# Patient Record
Sex: FEMALE | URBAN - METROPOLITAN AREA
[De-identification: names, ages, dates, MRNs, and addresses within clinical notes are randomized per-mention and may not be internally consistent; named-entity substitution may affect disease eponyms.]

---

## 2024-01-05 ENCOUNTER — HOSPITAL ENCOUNTER (OUTPATIENT)
Age: 34
Discharge: HOME | End: 2024-01-05
Attending: OBSTETRICS & GYNECOLOGY | Admitting: OBSTETRICS & GYNECOLOGY

## 2024-01-05 VITALS
OXYGEN SATURATION: 100 % | TEMPERATURE: 97.5 F | HEIGHT: 63 IN | SYSTOLIC BLOOD PRESSURE: 106 MMHG | HEART RATE: 85 BPM | WEIGHT: 138 LBS | DIASTOLIC BLOOD PRESSURE: 65 MMHG | BODY MASS INDEX: 24.45 KG/M2 | RESPIRATION RATE: 16 BRPM

## 2024-01-05 PROBLEM — R10.9 ABDOMINAL PAIN: Status: ACTIVE | Noted: 2024-01-05

## 2024-01-05 LAB
BACTERIA URNS QL MICRO: NORMAL /HPF
BILIRUB UR QL STRIP.AUTO: NEGATIVE
CLARITY UR: CLEAR
COLOR UR: YELLOW
EPI CELLS #/AREA URNS AUTO: 2 /HPF (ref 0–5)
GLUCOSE UR STRIP.AUTO-MCNC: NEGATIVE MG/DL
HGB UR QL STRIP.AUTO: ABNORMAL
HYALINE CASTS #/AREA URNS AUTO: 1 /LPF (ref 0–8)
KETONES UR STRIP.AUTO-MCNC: NEGATIVE MG/DL
LEUKOCYTE ESTERASE UR QL STRIP.AUTO: ABNORMAL
NITRITE UR QL STRIP.AUTO: NEGATIVE
PH UR STRIP.AUTO: 6 [PH] (ref 5–8)
PROT UR STRIP.AUTO-MCNC: NEGATIVE MG/DL
RBC CLUMPS #/AREA URNS AUTO: 2 /HPF (ref 0–4)
SP GR UR STRIP.AUTO: <=1.005 (ref 1–1.03)
UA DIPSTICK W REFLEX MICRO PNL UR: YES
URN SPEC COLLECT METH UR: ABNORMAL
UROBILINOGEN UR STRIP-ACNC: 0.2 E.U./DL
WBC #/AREA URNS AUTO: 5 /HPF (ref 0–5)

## 2024-01-05 PROCEDURE — 87491 CHLMYD TRACH DNA AMP PROBE: CPT

## 2024-01-05 PROCEDURE — 99202 OFFICE O/P NEW SF 15 MIN: CPT

## 2024-01-05 PROCEDURE — 81001 URINALYSIS AUTO W/SCOPE: CPT

## 2024-01-05 PROCEDURE — 87591 N.GONORRHOEAE DNA AMP PROB: CPT

## 2024-01-05 RX ORDER — MICONAZOLE NITRATE 200 MG/1
200 SUPPOSITORY VAGINAL NIGHTLY
Qty: 3 SUPPOSITORY | Refills: 0 | Status: SHIPPED | OUTPATIENT
Start: 2024-01-05 | End: 2024-01-08

## 2024-01-05 RX ORDER — MICONAZOLE NITRATE 200 MG/1
200 SUPPOSITORY VAGINAL NIGHTLY
Qty: 3 SUPPOSITORY | Refills: 0 | Status: SHIPPED | OUTPATIENT
Start: 2024-01-05 | End: 2024-01-12

## 2024-01-06 NOTE — FLOWSHEET NOTE
Patient to triage with abd pain. Denies vaginal bleeding. Thinks she is leaking fluid sometimes. Last menstrual period was 7/29/2023. She has not had any prenatal care.

## 2024-01-06 NOTE — H&P
Department of Obstetrics and Gynecology  Labor and Delivery Triage Note        CHIEF COMPLAINT:  vaginal discharge    HISTORY OF PRESENT ILLNESS:      The patient is a 33 y.o. female 22w6d.    OB History          5    Para   4    Term                AB        Living             SAB        IAB        Ectopic        Molar        Multiple        Live Births                  Patient presents with a chief complaint as above and lower abdominal cramping.  She is leaking watery yellow fluid vaginal which is staining underwear. + vag itching.  Denies bleeding, dysuria, cramping.        Estimated Due Date:  Estimated Date of Delivery: 24    PAST MEDICAL HISTORY: History reviewed. No pertinent past medical history.    PAST  SURGICAL HISTORY: History reviewed. No pertinent surgical history.    SOCIAL HISTORY:     reports that she has never smoked. She has never used smokeless tobacco. Alcohol use questions deferred to the physician. She reports that she does not use drugs.     MEDICATIONS:    Prior to Admission medications    Not on File        PRENATAL CARE:    Complicated by: no prenatal care    REVIEW OF SYSTEMS:     Gastrointestinal: negative  Genitourinary:positive for see HPI    PHYSICAL EXAM:    Vital Signs: Blood pressure 106/65, pulse 85, temperature 97.5 °F (36.4 °C), temperature source Oral, resp. rate 16, height 1.6 m (5' 3\"), weight 62.6 kg (138 lb), last menstrual period 2023, SpO2 100 %.    Abdomen soft, non tender, consistent with her EGA.  Speculum exam: mild thick yellow vaginal discharge    Fetal heart rate:  positive  Cervix:  Closed Long medium     Contraction frequency:  0 minutes  Bedside USD: Normal amniotic fluid. Vertex. Posterior G1 placenta. Active fetus.   Membranes:  Intact    General Labs:          ASSESSMENT:    22w6d.  Patient Active Problem List   Diagnosis    Abdominal pain    Insufficient prenatal care  Probable yeast infection    PLAN:roslyn MARTINEZ DNA collected. [facility]

## 2024-01-06 NOTE — DISCHARGE INSTRUCTIONS
Home Undelivered Discharge Instructions    After Discharge Orders:    No future appointments.    Call and make appointment with a Clinic, list was provided              Diet:  normal diet as tolerated    Rest: normal activity as tolerated    Other instructions: Do kick counts once a day on your baby. Choose the time of day your baby is most active. Get in a comfortable lying or sitting position and time how long it takes to feel 10 kicks, twists, turns, swishes, or rolls. Call your physician or midwife if there have not been 10 kicks in 2 hours    Call physician or midwife, return to Labor and Delivery, call 911, or go to the nearest Emergency Room if: increased leakage or fluid, contractions more than  6 per  1 hour, decreased fetal movement, persistent low back pain or cramping, bleeding from vaginal area, difficulty urinating, pain with urination, difficulty breathing, new calf pain, persistent headache, or vision change

## 2024-01-09 LAB
C TRACH DNA CVX QL NAA+PROBE: NEGATIVE
N GONORRHOEA DNA CERV MUCUS QL NAA+PROBE: NEGATIVE